# Patient Record
Sex: FEMALE | Race: ASIAN | NOT HISPANIC OR LATINO | URBAN - METROPOLITAN AREA
[De-identification: names, ages, dates, MRNs, and addresses within clinical notes are randomized per-mention and may not be internally consistent; named-entity substitution may affect disease eponyms.]

---

## 2021-10-09 ENCOUNTER — EMERGENCY (EMERGENCY)
Facility: HOSPITAL | Age: 61
LOS: 1 days | Discharge: HOME | End: 2021-10-09
Attending: STUDENT IN AN ORGANIZED HEALTH CARE EDUCATION/TRAINING PROGRAM
Payer: SELF-PAY

## 2021-10-09 VITALS
TEMPERATURE: 98 F | HEART RATE: 59 BPM | RESPIRATION RATE: 18 BRPM | SYSTOLIC BLOOD PRESSURE: 129 MMHG | DIASTOLIC BLOOD PRESSURE: 74 MMHG | OXYGEN SATURATION: 98 %

## 2021-10-09 PROCEDURE — 99282 EMERGENCY DEPT VISIT SF MDM: CPT

## 2021-10-09 RX ORDER — ACETAMINOPHEN 500 MG
975 TABLET ORAL ONCE
Refills: 0 | Status: COMPLETED | OUTPATIENT
Start: 2021-10-09 | End: 2021-10-09

## 2021-10-09 RX ORDER — LIDOCAINE 4 G/100G
1 CREAM TOPICAL ONCE
Refills: 0 | Status: COMPLETED | OUTPATIENT
Start: 2021-10-09 | End: 2021-10-09

## 2021-10-09 RX ADMIN — Medication 975 MILLIGRAM(S): at 15:19

## 2021-10-09 RX ADMIN — LIDOCAINE 1 PATCH: 4 CREAM TOPICAL at 14:49

## 2021-10-09 RX ADMIN — Medication 975 MILLIGRAM(S): at 14:48

## 2021-10-09 NOTE — ED ADULT TRIAGE NOTE - DOMESTIC TRAVEL HIGH RISK QUESTION
Transferred to Room 449 via baby in wheelchair. Room orientation completed. Pt up and abilio. IV running pit at 100 ml/hr.   Breastfeeding with minimal assistance.     Patients mobililty level scored using the bedside mobility assistance tool (BMAT). Patient is at a mobility level test number: 4. Mobility equipment used: none. Required assist of no staff members. Further use of BMAT scoring not required.    Maricarmen Rehman       No

## 2021-10-09 NOTE — ED PROVIDER NOTE - OBJECTIVE STATEMENT
61 year old female with no significant PMHx or PSHx presents to the ED with complaints of bilateral breast pain. Patient states that she experienced one similar episode in 01/2021 which resolved over the past nine months before recurring again two days ago. Patient describes the pain as an aching sensation which occurs in intermittent episodes, with about two to three episodes lasting a couple of seconds each per day. Patient notes that her last mammogram was five to six years ago. Patient additionally complains of left-sided neck and back pain, which she describes as an aching sensation which is worse with movement. Patient notes that she did not take any medication for relief of her symptoms. Patient denies any fevers, chills, night sweats, weight loss, change in appetite, and all other acute complaints. NKDA.

## 2021-10-09 NOTE — ED PROVIDER NOTE - CLINICAL SUMMARY MEDICAL DECISION MAKING FREE TEXT BOX
61 year old female with no significant PMHx or PSHx presents to the ED with complaints of bilateral breast pain. Given lapse in mammogram, will recommend outpatient mammogram. Will treat symptomatically for MSK/ back pain with lidocaine patch and Tylenol. Will reassess patient.

## 2021-10-09 NOTE — ED PROVIDER NOTE - ATTENDING CONTRIBUTION TO CARE
I was physically present for the E/M service provided. I agree with above history, physical, and plan which I have reviewed and edited where appropriate. I was physically present for the key portions of the service provided.     well appearing female, no acute distress, normal work of breathing. needs GYN followup.

## 2021-10-09 NOTE — ED ADULT NURSE NOTE - OBJECTIVE STATEMENT
pt is a 62 y/o female with c/o  bilateral breast pain x10 months . pt states pain is  on and off   but for 2 days pain  came back . pt states she needs a mammogram.

## 2021-10-09 NOTE — ED PROVIDER NOTE - MUSCULOSKELETAL, MLM
Breasts without any focal induration, lesions, erythema, abscesses, or tenderness to palpation. Tenderness to palpation over the left trapezius. Full range of motion of the neck and left shoulder. No other evidence of trauma.

## 2021-10-09 NOTE — ED PROVIDER NOTE - NSFOLLOWUPINSTRUCTIONS_ED_ALL_ED_FT
You may be contacted by the Emergency Department Referrals Coordinator to set up your follow-up appointment within 24-48 hours of your discharge, Monday to Friday. We recommend you follow up with: your primary care doctor for a mammogram      Please return to the Emergency Department if you experience any of the following symptoms:   - Shortness of breath or trouble breathing  - Pressure, pain or tightness in the chest  - Face drooping, arm weakness or speech difficulty  - Persistence of severe vomiting  - Head injury or loss of consciousness  - Nonstop bleeding or an open wound    (1) Follow up with your primary care physician within the next 24-48 hours as discussed. In addition, we did not find evidence of a life threatening illness on your testing here today, but listed below are the specialists that will be necessary to see as an outpatient to continue the workup.  Please call the numbers listed below or [phone] to set up the necessary appointments.  (2) Take Tylenol (up to 1000mg or 1 g)  and/or Motrin (up to 600mg) up to every 6 hours as needed for pain.   (3) Please continue taking all of your home medications as directed.    WHAT YOU NEED TO KNOW:    What do I need to know about a mammogram? A mammogram is an x-ray of your breasts to screen for breast cancer. Your healthcare provider will talk with you about the benefits and risks of mammograms. Together you will decide when you will get your first mammogram. This is usually at age 45 or 50. Your provider may recommend you start at 40 or younger if your risk for breast cancer is high. Mammograms usually continue every 1 to 2 years until age 74.    How do I prepare for a mammogram?   •Do not use deodorant, powder, lotion, or perfume. These products may cause particles to appear on your mammogram.    •Wear a 2-piece outfit.    •If your breasts are tender before your monthly period, do not have a mammogram during this time. Schedule your mammogram for 1 week after your period ends.    •If you are breastfeeding, express as much milk as possible before the mammogram    •Bring a list of the dates and places of your past mammograms and other breast tests or treatments.      What will happen during a mammogram? A top view and a side view x-ray are usually done for each breast. Tell healthcare providers if you have breast implants or breast problems before you have your mammogram. You may need extra x-rays of each breast.   •You will be given a hospital gown. Take off your clothes from the waist up. Wear the hospital gown so that it opens in the front.    •You will sit or stand next to a small x-ray machine. The healthcare provider will help you place one of your breasts on the x-ray plate. Your arm and breast will be moved until your breast is in the correct position.    •Your breast will be gently pressed between 2 plates for a few seconds while the x-ray is taken. This may be uncomfortable.    •You will be asked to hold your breath while the x-ray is taken. Another x-ray will be taken of the same breast after the position of the x-ray machine has been changed.    •Your other breast will be x-rayed the same way.         What will happen after my mammogram? Your breasts may feel tender for a short time after the mammogram. You may go back to your regular activities. Ask your healthcare provider when you should receive the results of your mammogram.    What are the risks of a mammogram? You will be exposed to a small amount of radiation. Some breast cancers may not show up on mammograms.    When should I call my doctor?   •You do not receive your results when expected.  •You have questions or concerns about the mammogram.    CARE AGREEMENT:    You have the right to help plan your care. Learn about your health condition and how it may be treated. Discuss treatment options with your healthcare providers to decide what care you want to receive. You always have the right to refuse treatment.

## 2021-10-09 NOTE — ED PROVIDER NOTE - PATIENT PORTAL LINK FT
You can access the FollowMyHealth Patient Portal offered by [facility] by registering at the following website: [email or web address]. By joining [employer]’s FollowMyHealth portal, you will also be able to view your health information using other applications (apps) compatible with our system.
